# Patient Record
Sex: MALE | Race: WHITE
[De-identification: names, ages, dates, MRNs, and addresses within clinical notes are randomized per-mention and may not be internally consistent; named-entity substitution may affect disease eponyms.]

---

## 2019-06-12 ENCOUNTER — HOSPITAL ENCOUNTER (OUTPATIENT)
Dept: HOSPITAL 25 - OREAST | Age: 69
Discharge: HOME | End: 2019-06-12
Attending: SPECIALIST
Payer: MEDICARE

## 2019-06-12 VITALS — DIASTOLIC BLOOD PRESSURE: 61 MMHG | SYSTOLIC BLOOD PRESSURE: 126 MMHG

## 2019-06-12 DIAGNOSIS — Z87.891: ICD-10-CM

## 2019-06-12 DIAGNOSIS — K21.9: ICD-10-CM

## 2019-06-12 DIAGNOSIS — H25.11: Primary | ICD-10-CM

## 2019-06-12 DIAGNOSIS — I48.91: ICD-10-CM

## 2019-06-12 DIAGNOSIS — G47.33: ICD-10-CM

## 2019-06-12 DIAGNOSIS — I10: ICD-10-CM

## 2019-06-12 PROCEDURE — V2632 POST CHMBR INTRAOCULAR LENS: HCPCS

## 2019-06-12 NOTE — OP
OPERATIVE NOTE:

 

DATE OF OPERATION:  06/12/19

 

DATE OF BIRTH:  [birth year]

 

SURGEON:  Walter Roger M.D.

 

PREOPERATIVE DIAGNOSIS:  Cataract, right eye.

 

POSTOPERATIVE DIAGNOSIS:  Cataract, right eye.

 

OPERATIVE PROCEDURE:  Extracapsular cataract extraction with intraocular lens implant, right eye.

 

PROCEDURE:  The patient was brought to the operating room after being given 1/2% Alcaine with epineph
rine drops in the preoperative area.  The eye was prepped and draped in the usual sterile fashion.  S
terile drape and eyelid speculum were placed.  Again, topical 1/2% Alcaine with epinephrine was given
.  A paracentesis incision was made at the 9 o'clock position with the No.75 blade.  Clear cornea inc
ision 2.2 x 2.2-mm was created at the 12 o'clock position starting at the anterior limbus using the 2
.2-mm keratome.  The anterior chamber was irrigated with 0.4 mL of 1% non-preservative intracameral l
idocaine and filled with DisCoVisc.  A capsulorrhexis was completed using the cystotome and the Utrat
a forceps. Hydrodissection was performed with balanced salt solution.  The lens nucleus was removed w
ith the Phacoemulsification handpiece without incident.  Cortex was removed with the irrigation-aspir
ation handpiece.  The capsular bag was re-inflated using DisCoVisc and an [id number] 19 implant was inser
desiree with the shooter.  The pupil was very small, so a Malyugin ring was used to dilate the pupil prio
r to capsulorrhexis.  This was removed after the insertion of the lens.  Indication for complex catar
act surgery:  Pupil abnormalities requiring pupil dilation device. The irrigation-aspiration handpiec
e was used to remove all residual DisCoVisc.  The eye was refilled with balanced salt solution and th
e wound checked and found to be watertight.  Topical Maxitrol drops were given.

 

 [id number]/[place] [facility] #: [id number]

## 2019-06-19 ENCOUNTER — HOSPITAL ENCOUNTER (OUTPATIENT)
Dept: HOSPITAL 25 - OREAST | Age: 69
Discharge: HOME | End: 2019-06-19
Attending: SPECIALIST
Payer: MEDICARE

## 2019-06-19 VITALS — DIASTOLIC BLOOD PRESSURE: 62 MMHG | SYSTOLIC BLOOD PRESSURE: 104 MMHG

## 2019-06-19 DIAGNOSIS — N40.0: ICD-10-CM

## 2019-06-19 DIAGNOSIS — I48.91: ICD-10-CM

## 2019-06-19 DIAGNOSIS — Z87.891: ICD-10-CM

## 2019-06-19 DIAGNOSIS — K21.9: ICD-10-CM

## 2019-06-19 DIAGNOSIS — H25.12: Primary | ICD-10-CM

## 2019-06-19 DIAGNOSIS — Z96.1: ICD-10-CM

## 2019-06-19 DIAGNOSIS — I10: ICD-10-CM

## 2019-06-19 PROCEDURE — V2632 POST CHMBR INTRAOCULAR LENS: HCPCS

## 2019-06-19 NOTE — OP
OPERATIVE NOTE:

 

DATE OF OPERATION:  06/19/19 - [facility]

 

DATE OF BIRTH:  [birth year]

 

SURGEON:  Walter Roger M.D.

 

PREOPERATIVE DIAGNOSIS:  Cataract, left eye.

 

POSTOPERATIVE DIAGNOSIS:  Cataract, left eye.

 

OPERATIVE PROCEDURE:  Extracapsular cataract extraction with intraocular lens 
implant, left eye.

 

PROCEDURE:  The patient was brought to the operating room after being given 1/2
% Alcaine with epinephrine drops in the preoperative area.  The eye was prepped 
and draped in the usual sterile fashion.  Sterile drape and eyelid speculum 
were placed.  Again, topical 1/2% Alcaine with epinephrine was given.  A 
paracentesis incision was made at the 3 o'clock position with the No.75 blade.  
Clear cornea incision 2.2 x 2.2-mm was created at the 6 o'clock position 
starting at the anterior limbus using the 2.2-mm keratome.  The anterior 
chamber was irrigated with 0.4 mL of 1% non-preservative intracameral lidocaine 
and filled with DisCoVisc.  A capsulorrhexis was completed using the cystotome 
and the Utrata forceps. Hydrodissection was performed with balanced salt 
solution.  The lens nucleus was removed with the Phacoemulsification handpiece 
without incident.  Cortex was removed with the irrigation-aspiration handpiece.
  The capsular bag was re-inflated using DisCoVisc and an SN60WF 18.5 implant 
was inserted with the shooter.  Pupils is only 3 mm.  A Malyugin ring was used 
to dilate the pupil, removed after insertion of the lens.  The irrigation-
aspiration handpiece was used to remove all residual DisCoVisc.  The eye was 
refilled with balanced salt solution and the wound checked and found to be 
watertight.  Topical Maxitrol drops were given.

 

Indication for complex cataract surgery:  Pupil abnormalities requiring pupil 
dilation device.

 

 [id number]/[facility] #: [id number]

MTDD